# Patient Record
Sex: FEMALE | Race: WHITE | NOT HISPANIC OR LATINO | Employment: FULL TIME | URBAN - METROPOLITAN AREA
[De-identification: names, ages, dates, MRNs, and addresses within clinical notes are randomized per-mention and may not be internally consistent; named-entity substitution may affect disease eponyms.]

---

## 2019-12-31 ENCOUNTER — OFFICE VISIT (OUTPATIENT)
Dept: URGENT CARE | Facility: CLINIC | Age: 59
End: 2019-12-31
Payer: COMMERCIAL

## 2019-12-31 VITALS
HEART RATE: 91 BPM | WEIGHT: 248 LBS | TEMPERATURE: 99 F | DIASTOLIC BLOOD PRESSURE: 78 MMHG | SYSTOLIC BLOOD PRESSURE: 132 MMHG | OXYGEN SATURATION: 97 %

## 2019-12-31 DIAGNOSIS — H10.33 ACUTE CONJUNCTIVITIS OF BOTH EYES, UNSPECIFIED ACUTE CONJUNCTIVITIS TYPE: ICD-10-CM

## 2019-12-31 PROCEDURE — 99203 OFFICE O/P NEW LOW 30 MIN: CPT | Performed by: PHYSICIAN ASSISTANT

## 2019-12-31 RX ORDER — OLOPATADINE HYDROCHLORIDE 1 MG/ML
1 SOLUTION/ DROPS OPHTHALMIC 2 TIMES DAILY
Qty: 5 ML | Refills: 0 | Status: SHIPPED | OUTPATIENT
Start: 2019-12-31 | End: 2020-01-10

## 2019-12-31 RX ORDER — EPINASTINE HCL 0.05 %
DROPS OPHTHALMIC (EYE)
Qty: 1 BOTTLE | Refills: 0 | Status: SHIPPED | OUTPATIENT
Start: 2019-12-31

## 2019-12-31 ASSESSMENT — ENCOUNTER SYMPTOMS
FEVER: 0
HEADACHES: 0
BLURRED VISION: 0
EYE PAIN: 0
SORE THROAT: 0
DIZZINESS: 0
EYE DISCHARGE: 1
EYE REDNESS: 1
VOMITING: 0
DOUBLE VISION: 0
PHOTOPHOBIA: 0
VISUAL CHANGE: 0

## 2019-12-31 NOTE — PROGRESS NOTES
Subjective:      Gaye Valladares is a 59 y.o. female who presents with Conjunctivitis (x1 day)            Patient is a 59-year-old female who presents to urgent care with left eye redness and tearing began a day and half ago.  She woke this morning with right eye redness and tearing as well today which prompted patient to have evaluation.  Patient wears glasses denies contact lens usage.  She denies photophobia, foreign body sensation, blurry vision, headache or history of same.  She denies recent illness as well.  She has been utilizing Visine drops without improvement of symptoms.    Conjunctivitis   This is a new problem. The current episode started yesterday. The problem occurs constantly. The problem has been waxing and waning. Pertinent negatives include no congestion, fever, headaches, rash, sore throat, visual change or vomiting. Nothing aggravates the symptoms. Treatments tried: As above.        Review of Systems   Constitutional: Negative for fever.   HENT: Negative for congestion and sore throat.    Eyes: Positive for discharge and redness. Negative for blurred vision, double vision, photophobia and pain.   Gastrointestinal: Negative for vomiting.   Skin: Negative for itching and rash.   Neurological: Negative for dizziness and headaches.   All other systems reviewed and are negative.         Objective:     /78 (BP Location: Left arm, Patient Position: Sitting, BP Cuff Size: Adult long)   Pulse 91   Temp 37.2 °C (99 °F)   Wt 112.5 kg (248 lb)   SpO2 97%    PMH:  has no past medical history on file.  MEDS: Reviewed .   ALLERGIES: No Known Allergies  SURGHX: No past surgical history on file.  SOCHX:    FH: Family history was reviewed, no pertinent findings to report      Physical Exam  Vitals signs reviewed.   Constitutional:       General: She is not in acute distress.     Appearance: She is well-developed.   HENT:      Head: Normocephalic and atraumatic.      Right Ear: External ear normal.      " Left Ear: External ear normal.      Mouth/Throat:      Pharynx: No oropharyngeal exudate.   Eyes:      General:         Right eye: Discharge present. No foreign body.         Left eye: Discharge present.No foreign body.      Conjunctiva/sclera:      Right eye: Right conjunctiva is injected. No chemosis or exudate.     Left eye: Left conjunctiva is injected. Chemosis present. No exudate.     Pupils: Pupils are equal, round, and reactive to light.      Right eye: No fluorescein uptake.      Left eye: No fluorescein uptake.      Comments: Without corneal abrasion bilaterally.   Neck:      Musculoskeletal: Normal range of motion and neck supple.      Trachea: No tracheal deviation.   Cardiovascular:      Rate and Rhythm: Normal rate.      Heart sounds: No murmur.   Pulmonary:      Effort: Pulmonary effort is normal. No respiratory distress.   Skin:     General: Skin is warm.   Neurological:      Mental Status: She is alert and oriented to person, place, and time.      Coordination: Coordination normal.   Psychiatric:         Behavior: Behavior normal.         Thought Content: Thought content normal.         Judgment: Judgment normal.                 Assessment/Plan:       1. Acute conjunctivitis of both eyes, unspecified acute conjunctivitis type  - olopatadine (PATANOL) 0.1 % ophthalmic solution; Place 1 Drop in both eyes 2 times a day for 10 days.  Dispense: 5 mL; Refill: 0    Unfortunately after exam today patient became notably lightheaded after sleeping for foreign body.  Immediately an ice pack was applied patient lie down.  Patient felt profoundly better and left in stable condition.  Will write for the above.  Discussed worrisome \"red eye\" symptoms and what to follow up for.     Patient given precautionary s/sx that mandate immediate follow up and evaluation in the ED. Advised of risks of not doing so.    DDX, Supportive care, and indications for immediate follow-up discussed with patient.    Instructed to " return to clinic or nearest emergency department if we are not available for any change in condition, further concerns, or worsening of symptoms.    The patient demonstrated a good understanding and agreed with the treatment plan.  Please note that this dictation was created using voice recognition software. I have made every reasonable attempt to correct obvious errors, but I expect that there are errors of grammar and possibly content that I did not discover before finalizing the note.

## 2021-03-15 DIAGNOSIS — Z23 NEED FOR VACCINATION: ICD-10-CM
